# Patient Record
Sex: MALE | Race: WHITE | NOT HISPANIC OR LATINO | URBAN - METROPOLITAN AREA
[De-identification: names, ages, dates, MRNs, and addresses within clinical notes are randomized per-mention and may not be internally consistent; named-entity substitution may affect disease eponyms.]

---

## 2017-04-27 ENCOUNTER — IMPORTED ENCOUNTER (OUTPATIENT)
Dept: URBAN - METROPOLITAN AREA CLINIC 50 | Facility: CLINIC | Age: 82
End: 2017-04-27

## 2018-04-26 ENCOUNTER — IMPORTED ENCOUNTER (OUTPATIENT)
Dept: URBAN - METROPOLITAN AREA CLINIC 50 | Facility: CLINIC | Age: 83
End: 2018-04-26

## 2019-05-02 ENCOUNTER — IMPORTED ENCOUNTER (OUTPATIENT)
Dept: URBAN - METROPOLITAN AREA CLINIC 50 | Facility: CLINIC | Age: 84
End: 2019-05-02

## 2020-05-14 ENCOUNTER — IMPORTED ENCOUNTER (OUTPATIENT)
Dept: URBAN - METROPOLITAN AREA CLINIC 50 | Facility: CLINIC | Age: 85
End: 2020-05-14

## 2020-12-16 ENCOUNTER — IMPORTED ENCOUNTER (OUTPATIENT)
Dept: URBAN - METROPOLITAN AREA CLINIC 50 | Facility: CLINIC | Age: 85
End: 2020-12-16

## 2021-04-17 ASSESSMENT — VISUAL ACUITY
OS_PH: 20/25-3
OS_PH: 20/25-1
OS_BAT: 20/40
OD_OTHER: 20/30. 20/30.
OD_CC: 20/20
OD_BAT: 20/30
OD_CC: 20/25-1
OS_OTHER: 20/30. 20/40.
OS_BAT: 20/30
OS_OTHER: 20/40. 20/40.
OS_CC: J1+
OS_CC: 20/40-1
OS_BAT: 20/40
OD_CC: 20/25-3
OS_CC: J1+@ 16 IN
OS_CC: 20/40-2
OD_CC: 20/25+1
OS_OTHER: 20/40. 20/50.
OD_CC: J1+
OD_OTHER: 20/25. 20/30.
OD_OTHER: 20/30. 20/50+.
OD_CC: J1+@ 16 IN
OS_CC: J1+@ 16 IN
OD_BAT: 20/30
OD_BAT: 20/25
OS_CC: 20/30-
OS_CC: 20/25+
OD_CC: J1+@ 16 IN

## 2021-04-17 ASSESSMENT — TONOMETRY
OS_IOP_MMHG: 12
OD_IOP_MMHG: 12
OS_IOP_MMHG: 11
OS_IOP_MMHG: 12
OD_IOP_MMHG: 11
OD_IOP_MMHG: 10
OD_IOP_MMHG: 12
OS_IOP_MMHG: 10

## 2021-06-21 ENCOUNTER — PREPPED CHART (OUTPATIENT)
Dept: URBAN - METROPOLITAN AREA CLINIC 52 | Facility: CLINIC | Age: 86
End: 2021-06-21

## 2021-06-24 ENCOUNTER — COMPREHENSIVE EXAM (OUTPATIENT)
Dept: URBAN - METROPOLITAN AREA CLINIC 52 | Facility: CLINIC | Age: 86
End: 2021-06-24

## 2021-06-24 DIAGNOSIS — H35.3131: ICD-10-CM

## 2021-06-24 DIAGNOSIS — H26.493: ICD-10-CM

## 2021-06-24 PROCEDURE — 92134 CPTRZ OPH DX IMG PST SGM RTA: CPT

## 2021-06-24 PROCEDURE — 92015 DETERMINE REFRACTIVE STATE: CPT

## 2021-06-24 PROCEDURE — 92014 COMPRE OPH EXAM EST PT 1/>: CPT

## 2021-06-24 ASSESSMENT — TONOMETRY
OS_IOP_MMHG: 12
OD_IOP_MMHG: 12

## 2021-06-24 ASSESSMENT — VISUAL ACUITY
OS_CC: 20/50+1
OD_GLARE: 20/50
OS_GLARE: 20/70
OD_CC: 20/30-2
OU_CC: J1
OS_GLARE: 20/40
OS_PH: 20/30
OD_GLARE: 20/40

## 2022-12-29 ENCOUNTER — COMPREHENSIVE EXAM (OUTPATIENT)
Dept: URBAN - METROPOLITAN AREA CLINIC 52 | Facility: CLINIC | Age: 87
End: 2022-12-29

## 2022-12-29 PROCEDURE — 92015 DETERMINE REFRACTIVE STATE: CPT

## 2022-12-29 PROCEDURE — 92014 COMPRE OPH EXAM EST PT 1/>: CPT

## 2022-12-29 ASSESSMENT — TONOMETRY
OS_IOP_MMHG: 11
OD_IOP_MMHG: 12

## 2022-12-29 ASSESSMENT — VISUAL ACUITY
OD_GLARE: 20/70
OS_GLARE: 20/70
OU_CC: J1+
OS_GLARE: 20/80
OS_PH: 20/40
OD_PH: 20/40
OD_SC: 20/70-1
OD_GLARE: 20/100
OS_SC: 20/200

## 2022-12-29 NOTE — PATIENT DISCUSSION
PCO (NO TX): PCO is not visually significant. Educated patient on signs and symptoms of PCO. Continue to monitor at this time. no